# Patient Record
Sex: FEMALE | Race: ASIAN | Employment: FULL TIME | URBAN - METROPOLITAN AREA
[De-identification: names, ages, dates, MRNs, and addresses within clinical notes are randomized per-mention and may not be internally consistent; named-entity substitution may affect disease eponyms.]

---

## 2018-04-09 ENCOUNTER — OFFICE VISIT (OUTPATIENT)
Dept: OBGYN CLINIC | Facility: CLINIC | Age: 18
End: 2018-04-09

## 2018-04-09 VITALS
HEART RATE: 64 BPM | SYSTOLIC BLOOD PRESSURE: 98 MMHG | HEIGHT: 64 IN | WEIGHT: 135 LBS | TEMPERATURE: 98 F | DIASTOLIC BLOOD PRESSURE: 64 MMHG | BODY MASS INDEX: 23.05 KG/M2 | RESPIRATION RATE: 12 BRPM

## 2018-04-09 DIAGNOSIS — N92.6 IRREGULAR MENSES: ICD-10-CM

## 2018-04-09 DIAGNOSIS — N91.5 OLIGOMENORRHEA, UNSPECIFIED TYPE: Primary | ICD-10-CM

## 2018-04-09 PROCEDURE — 99203 OFFICE O/P NEW LOW 30 MIN: CPT | Performed by: OBSTETRICS & GYNECOLOGY

## 2018-04-09 RX ORDER — MEDROXYPROGESTERONE ACETATE 10 MG/1
10 TABLET ORAL DAILY
Qty: 10 TABLET | Refills: 0 | Status: SHIPPED | OUTPATIENT
Start: 2018-04-09 | End: 2018-04-19

## 2018-04-09 NOTE — PROGRESS NOTES
GYN H&P     2018  4:38 PM    CC: Patient is here to establish care and discuss irregular menses    HPI: Patient is a 21year old  here . Reports irregular . Cycles  . No pelvic pain. No abnormal vaginal discharge .    Patient states that her last no lesions  HEENT: normal  LUNGS: respiration unlabored  CARDIOVASCULAR: no peripheral edema or varicosities, skin warm and dry  BREASTS: bilaterally nontender, no palpable masses, no nipple discharge, no skin changes, no axillary adenopathy  ABDOMEN: Soft

## 2018-04-09 NOTE — PROGRESS NOTES
Pt states periods are very irregular. LMP was end of February, she has never really had a period that comes regularly.

## 2018-04-12 ENCOUNTER — APPOINTMENT (OUTPATIENT)
Dept: OBGYN CLINIC | Facility: CLINIC | Age: 18
End: 2018-04-12

## 2018-04-13 ENCOUNTER — LAB ENCOUNTER (OUTPATIENT)
Dept: LAB | Age: 18
End: 2018-04-13
Attending: OBSTETRICS & GYNECOLOGY

## 2018-04-13 ENCOUNTER — MED REC SCAN ONLY (OUTPATIENT)
Dept: OBGYN CLINIC | Facility: CLINIC | Age: 18
End: 2018-04-13

## 2018-04-13 DIAGNOSIS — N92.6 IRREGULAR MENSES: ICD-10-CM

## 2018-04-13 PROCEDURE — 82670 ASSAY OF TOTAL ESTRADIOL: CPT | Performed by: OBSTETRICS & GYNECOLOGY

## 2018-04-13 PROCEDURE — 84443 ASSAY THYROID STIM HORMONE: CPT | Performed by: OBSTETRICS & GYNECOLOGY

## 2018-04-13 PROCEDURE — 83001 ASSAY OF GONADOTROPIN (FSH): CPT | Performed by: OBSTETRICS & GYNECOLOGY

## 2018-04-13 PROCEDURE — 36415 COLL VENOUS BLD VENIPUNCTURE: CPT | Performed by: OBSTETRICS & GYNECOLOGY

## 2018-04-13 PROCEDURE — 83002 ASSAY OF GONADOTROPIN (LH): CPT | Performed by: OBSTETRICS & GYNECOLOGY

## 2018-06-13 ENCOUNTER — OFFICE VISIT (OUTPATIENT)
Dept: OBGYN CLINIC | Facility: CLINIC | Age: 18
End: 2018-06-13

## 2018-06-13 VITALS
SYSTOLIC BLOOD PRESSURE: 106 MMHG | HEIGHT: 66 IN | TEMPERATURE: 98 F | RESPIRATION RATE: 12 BRPM | DIASTOLIC BLOOD PRESSURE: 68 MMHG | HEART RATE: 68 BPM | WEIGHT: 119 LBS | BODY MASS INDEX: 19.13 KG/M2

## 2018-06-13 DIAGNOSIS — N91.2 OLIGO-OVULATION WITH AMENORRHEA: Primary | ICD-10-CM

## 2018-06-13 DIAGNOSIS — N97.0 OLIGO-OVULATION WITH AMENORRHEA: Primary | ICD-10-CM

## 2018-06-13 PROCEDURE — 99213 OFFICE O/P EST LOW 20 MIN: CPT | Performed by: OBSTETRICS & GYNECOLOGY

## 2018-06-13 RX ORDER — MEDROXYPROGESTERONE ACETATE 10 MG/1
TABLET ORAL
Qty: 10 TABLET | Refills: 5 | Status: SHIPPED | OUTPATIENT
Start: 2018-06-13 | End: 2018-08-15

## 2018-06-13 NOTE — PROGRESS NOTES
HPI:   Jennifer Bosch  is a 21year old female presents for consultation regarding not getting menses. [address] LH and estradiol done that was normal.  He had a period that started on April 30 for 5 days after taking Provera with normal flow.   She has some cram depression or anxiety  HEMATOLOGY: denies hx anemia; denies bruising or excessive bleeding    MENSES: as above  PAP SMEAR HX: n/a    ASSESSMENT/PLAN:   1. Oligo-ovulation with amenorrhea  Provera 10 mg 1 p.o. daily for 10 days.   5 refills      Plan: As abo

## 2018-06-13 NOTE — PROGRESS NOTES
Pt states she did the course of Provera and got a period. She has not had a period since she has taken the Provera.

## 2018-06-27 ENCOUNTER — TELEPHONE (OUTPATIENT)
Dept: OBGYN CLINIC | Facility: CLINIC | Age: 18
End: 2018-06-27

## 2018-06-27 NOTE — TELEPHONE ENCOUNTER
Patient is checking to see if prescription was called in. Pharmacy told her they don't have it. She said it was for her periods.    Confirmed pharmacy with patient curtis in [place] on [facility]

## 2018-06-27 NOTE — TELEPHONE ENCOUNTER
PC with patient and friend. \[facility]\"" pharmacy does not have her prescription for medication to bring menses on. Order was sent to pharmacy 6/13/2018. Called pharmacy to verify they received it. They have it and will get it ready. Patient aware.

## 2018-08-03 ENCOUNTER — TELEPHONE (OUTPATIENT)
Dept: OBGYN CLINIC | Facility: CLINIC | Age: 18
End: 2018-08-03

## 2018-08-03 NOTE — TELEPHONE ENCOUNTER
Having trouble sleeping, she thinks it because of her medication she is on.    Offered her appointment, doesn't want to come in

## 2018-08-03 NOTE — TELEPHONE ENCOUNTER
PC with patient and friend Ricci Alvarenga whom she said I could speak with. Aware that she can stop the provera for a month and see if symptoms improve. If still has sleep issues, should see pcp. Understands.

## 2018-08-03 NOTE — TELEPHONE ENCOUNTER
PC with patient. Has taken Provera for 2 cycles. States is a having a sleep issue. She is having strange dreams and wakes her up. Wants to know if the medication can cause that or does she need to see some regarding this?  Nothing is changed in her personal Please review this request, you last saw pt >6 months ago

## 2018-08-15 ENCOUNTER — OFFICE VISIT (OUTPATIENT)
Dept: OBGYN CLINIC | Facility: CLINIC | Age: 18
End: 2018-08-15

## 2018-08-15 VITALS
BODY MASS INDEX: 20.83 KG/M2 | HEART RATE: 68 BPM | DIASTOLIC BLOOD PRESSURE: 64 MMHG | HEIGHT: 64 IN | TEMPERATURE: 99 F | SYSTOLIC BLOOD PRESSURE: 102 MMHG | WEIGHT: 122 LBS

## 2018-08-15 DIAGNOSIS — N91.4 SECONDARY OLIGOMENORRHEA: Primary | ICD-10-CM

## 2018-08-15 PROCEDURE — 99213 OFFICE O/P EST LOW 20 MIN: CPT | Performed by: OBSTETRICS & GYNECOLOGY

## 2018-08-15 NOTE — PROGRESS NOTES
HPI:   Manuel Reece  is a 19year old female presents for consultation regarding not getting cycles on her own.   In 2017 patient states that she was skipping however no longer than 2 months to get her cycles however December was her last menses that she g anxiety  HEMATOLOGY: denies hx anemia; denies bruising or excessive bleeding    MENSES:  As above  PAP SMEAR HX: n/a    ASSESSMENT/PLAN:   1. Secondary oligomenorrhea  It was discussed with the patient that I 2 hormones are needed for the cycle.   She needs

## 2018-09-24 ENCOUNTER — PATIENT MESSAGE (OUTPATIENT)
Dept: OBGYN CLINIC | Facility: CLINIC | Age: 18
End: 2018-09-24

## 2018-09-25 NOTE — TELEPHONE ENCOUNTER
From: Sherial December   To: Louie Sneed MD  Sent: 9/24/2018 6:49 PM CDT  Subject: Other    Hello,My name is Ramakrishna Wilson. My doctor is Mrs Shauna Marie. This month,I have finished my medicine and a week later. My period is not coming yet. Could you please tell me what rochelle

## 2018-10-13 ENCOUNTER — OFFICE VISIT (OUTPATIENT)
Dept: OBGYN CLINIC | Facility: CLINIC | Age: 18
End: 2018-10-13

## 2018-10-13 VITALS
BODY MASS INDEX: 20.58 KG/M2 | SYSTOLIC BLOOD PRESSURE: 100 MMHG | HEART RATE: 80 BPM | DIASTOLIC BLOOD PRESSURE: 62 MMHG | HEIGHT: 64.5 IN | RESPIRATION RATE: 14 BRPM | WEIGHT: 122 LBS

## 2018-10-13 DIAGNOSIS — R63.4 WEIGHT LOSS: ICD-10-CM

## 2018-10-13 DIAGNOSIS — N91.2 AMENORRHEA: Primary | ICD-10-CM

## 2018-10-13 PROCEDURE — 99213 OFFICE O/P EST LOW 20 MIN: CPT | Performed by: OBSTETRICS & GYNECOLOGY

## 2018-10-13 NOTE — PROGRESS NOTES
HPI:   Jennifer Bosch  is a 19year old female presents for consultation regarding lack of withdrawal bleeding from taking Provera which she started on September 10 and took it to September 19 for 10 days.   She did not get any withdrawal bleeding from this wheezing or cough   CARDIOVASCULAR: denies chest pain or OTERO; no palpitations   GI: denies nausea, vomiting, constipation, diarrhea; no rectal bleeding; no heartburn  GYNE/: no dysuria, urgency or frequency; no abnormal vaginal discharge; no dyspareunia;

## 2018-10-13 NOTE — PROGRESS NOTES
No menses in 3 months. Has used provera in past and menses came. Last took provera 9/10/18 through 9/19/2018 and no menses after.

## 2018-10-15 ENCOUNTER — APPOINTMENT (OUTPATIENT)
Dept: LAB | Age: 18
End: 2018-10-15
Attending: OBSTETRICS & GYNECOLOGY

## 2018-10-15 DIAGNOSIS — N91.2 AMENORRHEA: ICD-10-CM

## 2018-10-15 PROCEDURE — 84146 ASSAY OF PROLACTIN: CPT | Performed by: OBSTETRICS & GYNECOLOGY

## 2018-10-15 PROCEDURE — 83002 ASSAY OF GONADOTROPIN (LH): CPT | Performed by: OBSTETRICS & GYNECOLOGY

## 2018-10-15 PROCEDURE — 83001 ASSAY OF GONADOTROPIN (FSH): CPT | Performed by: OBSTETRICS & GYNECOLOGY

## 2018-10-15 PROCEDURE — 36415 COLL VENOUS BLD VENIPUNCTURE: CPT | Performed by: OBSTETRICS & GYNECOLOGY

## 2018-10-15 PROCEDURE — 82670 ASSAY OF TOTAL ESTRADIOL: CPT | Performed by: OBSTETRICS & GYNECOLOGY

## 2019-01-23 ENCOUNTER — OFFICE VISIT (OUTPATIENT)
Dept: OBGYN CLINIC | Facility: CLINIC | Age: 19
End: 2019-01-23

## 2019-01-23 VITALS
HEART RATE: 80 BPM | BODY MASS INDEX: 23.04 KG/M2 | HEIGHT: 63 IN | TEMPERATURE: 99 F | SYSTOLIC BLOOD PRESSURE: 104 MMHG | RESPIRATION RATE: 14 BRPM | DIASTOLIC BLOOD PRESSURE: 68 MMHG | WEIGHT: 130 LBS

## 2019-01-23 DIAGNOSIS — N91.5 HYPOMENORRHEA/OLIGOMENORRHEA: Primary | ICD-10-CM

## 2019-01-23 DIAGNOSIS — L65.9 HAIR LOSS: ICD-10-CM

## 2019-01-23 PROCEDURE — 99212 OFFICE O/P EST SF 10 MIN: CPT | Performed by: OBSTETRICS & GYNECOLOGY

## 2019-01-23 RX ORDER — MEDROXYPROGESTERONE ACETATE 10 MG/1
TABLET ORAL
Refills: 5 | COMMUNITY
Start: 2018-09-05

## 2019-01-23 NOTE — PROGRESS NOTES
HPI:   Rain Britt  is a 19year old female presents for consultation regarding her history of amenorrhea with recent blood work showing a low estradiol level and normal FSH and LH.   She has been off OCP now for 3 cycles and has been getting her menses a normal.  She was encouraged to see a dermatologist for evaluation    2. Hypomenorrhea/oligomenorrhea        Plan: We will stop OCP after current pack. To keep track of any bleeding for the next 3-4 months.   If no menses patient was instructed to call

## 2019-02-04 ENCOUNTER — TELEPHONE (OUTPATIENT)
Dept: OBGYN CLINIC | Facility: CLINIC | Age: 19
End: 2019-02-04

## 2019-02-04 NOTE — TELEPHONE ENCOUNTER
Regarding: Non-Urgent Medical Question  Contact: [phone]  ----- Message from [address] sent at 2/3/2019  8:52 AM CST -----    Hi. Doctor. I want to still take medication for 2 months and then  stop. Is that better?

## 2019-02-04 NOTE — TELEPHONE ENCOUNTER
PC with patient. Asked what medication she is interested in? She states the medication to take everyday. I said the birth control pills? She said she thinks so. She said she will call back with her friend to help interpret.  Offered to use , but

## 2019-02-04 NOTE — TELEPHONE ENCOUNTER
Regarding: Non-Urgent Medical Question  Contact: [phone]  ----- Message from [address] sent at 2/3/2019  8:52 AM CST -----    Hi. Doctor. I want to still take medication for 2 months and then  stop. Is that better?

## 2019-02-08 ENCOUNTER — TELEPHONE (OUTPATIENT)
Dept: OBGYN CLINIC | Facility: CLINIC | Age: 19
End: 2019-02-08

## 2019-02-08 RX ORDER — NORGESTIMATE AND ETHINYL ESTRADIOL 0.25-0.035
1 KIT ORAL DAILY
Qty: 3 PACKAGE | Refills: 3 | Status: SHIPPED | OUTPATIENT
Start: 2019-02-08 | End: 2019-05-20

## 2019-02-08 NOTE — TELEPHONE ENCOUNTER
Regarding: Non-Urgent Medical Question  Contact: [phone]  ----- Message from [address] sent at 2/7/2019 10:41 AM CST -----    Hello. Doctor. I would like refill medicine that last time you gave me. Always starts Sunday. .Could you please refill two

## 2019-02-08 NOTE — TELEPHONE ENCOUNTER
PC with patient. Was seen 1/23/2019 for amenorrhea. She had discussed going on medication to take everyday with a Sunday start. States she wants to try for 2 months. Asking for a prescription.

## 2019-02-08 NOTE — TELEPHONE ENCOUNTER
PC with patient. Aware Dr Serena Lea has sent a prescription for 1 year for ocp to [facility]. If she chooses to take only for 2 months, she doesn't have to  the rest.  Since she is not sexually active, doctor said she can start on Sunday. Understands.

## 2019-02-11 ENCOUNTER — TELEPHONE (OUTPATIENT)
Dept: OBGYN CLINIC | Facility: CLINIC | Age: 19
End: 2019-02-11

## 2019-02-11 NOTE — TELEPHONE ENCOUNTER
PC with patient and her friend Myesha Anshul she is using as a . States she is still taking the last pack of ocp she had from before. They are 3 week pack of pills. Asking when does she start the new pack?  Told she would start them the week after she fin

## 2019-05-08 ENCOUNTER — TELEPHONE (OUTPATIENT)
Dept: OBGYN CLINIC | Facility: CLINIC | Age: 19
End: 2019-05-08

## 2019-05-08 NOTE — TELEPHONE ENCOUNTER
----- Message from Feliciano Madrigal  sent at 5/8/2019  2:19 PM CDT -----  Regarding: Non-Urgent Medical Question  Contact: [phone]  Hi. Doctor Sabi Gongora. I am Sarahi Land. I have stop the medication for 3 months. In this period. I don’t have menstruation. So .I want to

## 2019-05-08 NOTE — TELEPHONE ENCOUNTER
PC with patient. Stopped ocp 3 months ago. Has not gotten her menses since. Was told to call back if she did not get a period. Wants to go back on ocp.

## 2019-05-20 ENCOUNTER — OFFICE VISIT (OUTPATIENT)
Dept: OBGYN CLINIC | Facility: CLINIC | Age: 19
End: 2019-05-20

## 2019-05-20 VITALS
WEIGHT: 123 LBS | DIASTOLIC BLOOD PRESSURE: 62 MMHG | RESPIRATION RATE: 14 BRPM | SYSTOLIC BLOOD PRESSURE: 110 MMHG | HEART RATE: 72 BPM | HEIGHT: 63 IN | BODY MASS INDEX: 21.79 KG/M2

## 2019-05-20 DIAGNOSIS — N91.2 AMENORRHEA: Primary | ICD-10-CM

## 2019-05-20 PROCEDURE — 99213 OFFICE O/P EST LOW 20 MIN: CPT | Performed by: OBSTETRICS & GYNECOLOGY

## 2019-05-20 RX ORDER — NORGESTIMATE AND ETHINYL ESTRADIOL 0.25-0.035
1 KIT ORAL DAILY
Qty: 3 PACKAGE | Refills: 3 | Status: SHIPPED | OUTPATIENT
Start: 2019-05-20

## 2019-05-20 NOTE — PROGRESS NOTES
CHIEF COMPLAINT:   Patient presents with  amenorrhea   HPI:   Stephen Mayo  is a 19year old  No LMP recorded (lmp unknown). who presents with irregular menses when on OCP.   She had her last cycle on February 15 of 2019 when she finished her last pac